# Patient Record
Sex: FEMALE | Race: WHITE | NOT HISPANIC OR LATINO | Employment: UNEMPLOYED
[De-identification: names, ages, dates, MRNs, and addresses within clinical notes are randomized per-mention and may not be internally consistent; named-entity substitution may affect disease eponyms.]

---
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
## 2021-05-06 ENCOUNTER — OFFICE VISIT - HEALTHEAST (OUTPATIENT)
Dept: PEDIATRICS | Facility: CLINIC | Age: 21
End: 2021-05-06

## 2021-05-06 ENCOUNTER — COMMUNICATION - HEALTHEAST (OUTPATIENT)
Dept: PEDIATRICS | Facility: CLINIC | Age: 21
End: 2021-05-06

## 2021-05-06 DIAGNOSIS — Z20.822 EXPOSURE TO COVID-19 VIRUS: ICD-10-CM

## 2021-05-07 DIAGNOSIS — Z11.52 ENCOUNTER FOR SCREENING LABORATORY TESTING FOR COVID-19 VIRUS: ICD-10-CM

## 2021-05-07 DIAGNOSIS — Z11.52 ENCOUNTER FOR SCREENING LABORATORY TESTING FOR COVID-19 VIRUS: Primary | ICD-10-CM

## 2021-05-07 LAB
SARS-COV-2 RNA RESP QL NAA+PROBE: NORMAL
SPECIMEN SOURCE: NORMAL

## 2021-05-07 PROCEDURE — U0005 INFEC AGEN DETEC AMPLI PROBE: HCPCS | Performed by: STUDENT IN AN ORGANIZED HEALTH CARE EDUCATION/TRAINING PROGRAM

## 2021-05-07 PROCEDURE — U0003 INFECTIOUS AGENT DETECTION BY NUCLEIC ACID (DNA OR RNA); SEVERE ACUTE RESPIRATORY SYNDROME CORONAVIRUS 2 (SARS-COV-2) (CORONAVIRUS DISEASE [COVID-19]), AMPLIFIED PROBE TECHNIQUE, MAKING USE OF HIGH THROUGHPUT TECHNOLOGIES AS DESCRIBED BY CMS-2020-01-R: HCPCS | Performed by: STUDENT IN AN ORGANIZED HEALTH CARE EDUCATION/TRAINING PROGRAM

## 2021-05-08 LAB
LABORATORY COMMENT REPORT: NORMAL
SARS-COV-2 RNA RESP QL NAA+PROBE: NEGATIVE
SPECIMEN SOURCE: NORMAL

## 2021-05-26 ENCOUNTER — RECORDS - HEALTHEAST (OUTPATIENT)
Dept: ADMINISTRATIVE | Facility: CLINIC | Age: 21
End: 2021-05-26

## 2021-05-30 VITALS — HEIGHT: 62 IN | BODY MASS INDEX: 33.31 KG/M2 | WEIGHT: 181 LBS

## 2021-06-02 VITALS — WEIGHT: 189.6 LBS | HEIGHT: 61 IN | BODY MASS INDEX: 35.8 KG/M2

## 2021-06-04 VITALS
SYSTOLIC BLOOD PRESSURE: 109 MMHG | HEART RATE: 76 BPM | WEIGHT: 194.9 LBS | DIASTOLIC BLOOD PRESSURE: 72 MMHG | HEIGHT: 63 IN | OXYGEN SATURATION: 97 % | BODY MASS INDEX: 34.54 KG/M2

## 2021-06-10 NOTE — PROGRESS NOTES
[facility] Well Child Check    ASSESSMENT & PLAN  Daisy Stephens is a 20 y.o. who has normal growth and abnormal development:  autism.    Diagnoses and all orders for this visit:    Class 1 obesity due to excess calories without serious comorbidity with body mass index (BMI) of 34.0 to 34.9 in adult  -     Ambulatory referral for Weight Management    Encounter for routine child health examination without abnormal findings  -     HPV vaccine 9 valent 3 dose IM    Other orders  -     Cancel: Urinalysis-UC if Indicated    The following nutrition counseling was performed this visit:  recommendation to change food and drink intake, weight-reducing diet education and referral to hospital-based dietetics service  The following physical activity counseling was performed this visit: recommendation to exercise and referral to weight management regimen service      Return to clinic in 1 year for a Well Child Check or sooner as needed    IMMUNIZATIONS/LABS  Immunizations were reviewed and orders were placed as appropriate.  I have discussed the risks and benefits of all of the vaccine components with the patient/parents.  All questions have been answered.    REFERRALS  Dental:  Recommend routine dental care as appropriate., The patient has already established care with a dentist.  Other:  Referrals were made for weight management.    ANTICIPATORY GUIDANCE  I have reviewed age appropriate anticipatory guidance.  Parenting:  Support and [place]/Dependence  Nutrition:  Age Specific Nutritional Needs  Play and Communication:  Appropriate Use of TV  Health:  Acne and Activity (>45 min/day)  Sexuality:  Contraception    HEALTH HISTORY  Do you have any concerns that you'd like to discuss today?: weight managment, IDU done when she was 16 is having some cramping and some blood. Would like it re done.  IUD not IDU    Ankle Injury: On 8/9/2020 the patient developed right ankle pain and swelling after falling down several stairs.  The next day she tripped and fell while walking to the bathroom. When she was falling she knocked down a cast iron pan which then fell onto her right toes. On 8/10 they went to [facility] and they diagnosed her with right forefoot contusion involving the first and second toes as well as right lateral ankle sprain and mild deltoid sprain. They put her in a boot and she has also been using a walker. Today her ankle is getting better. Patient is going to ortho tomorrow for another follow-up.    Weight: Patient was previously going to [facility]. Patient did not like them telling her what should could eat but when she followed their instructions she did well. Patient was also very active at that time with swimming, weight training, and equestrianism. However now she is very sedentary. They have a treadmill at home but she refuses to use it.    Review of Systems:  Patient has some acne. They have not tried applying anything to her face. Patient had an IUD inserted on 3/24/2016 at [facility]. Mom wants it replaced. She is unsure what kind of IUD she has. All other reviewed systems are negative.     PSFH:  No recent change to medical, surgical, family, or social history.    No question data found.    Do you have any significant health concerns in your family history?: No  Family History   Problem Relation Age of Onset     Autism Brother      Anxiety disorder Mother      Diabetes Mother      Autism Father      No Medical Problems Maternal Grandmother      Stroke Maternal Grandfather          at age 47     Hypertension Maternal Grandfather      Diabetes Maternal Grandfather      Lung cancer Paternal Grandfather         smoker     Since your last visit, have there been any major changes in your family, such as a move, job change, separation, divorce, or death in the family?: Yes mother  and they recently moved to [place]  Has a lack of transportation  kept you from medical appointments?: No    Home  Who lives in your home?:  Same.   Social History     Social History Narrative    Lives at home with mom, maternal grandmother, and younger brother (Ronaldo).     Do you have any concerns about losing your housing?: No  Is your housing safe and comfortable?: Yes  Do you have any trouble with sleep?:  Night owl.     Education  What school do you child attend?:  [employer].   What grade are you in?:  N/A  How do you perform in school (grades, behavior, attention, homework?: hard to try new things.    Last year school and distance learning went well. Next year she is doing hybrid learning though mom is tentative about going back to school.     Eating  Do you eat regular meals including fruits and vegetables?:  yes  What are you drinking (cow's milk, water, soda, juice, sports drinks, energy drinks, etc)?: cow's milk- 2%, water and Coffee.   Have you been worried that you don't have enough food?: No  Do you have concerns about your body or appearance?:  No   Patient is a picky eater.    Activities  Do you have friends?:  no  Do you get at least one hour of physical activity per day?:  no  How many hours a day are you in front of a screen other than for schoolwork (computer, TV, phone)?:  6-8  What do you do for exercise?:  Nothing.   Do you have interest/participate in community activities/volunteers/school sports?:  no    VISION/HEARING  Vision: Patient is already followed by a vision specialist  Hearing:  Not done.   Patient sees and hears well.     No exam data present    MENTAL HEALTH SCREENING  No flowsheet data found.  Social-emotional & mental health screening: Pediatric Symptom Checklist-Youth PASS (<30 pass), no followup necessary  No concerns    TB Risk Assessment:  The patient and/or parent/guardian answer positive to:  no known risk of TB    Dyslipidemia Risk Screening  Have either of your parents or any of your grandparents had a stroke or  "heart attack before age 55?: Yes: <Mothers father had stroke at the age of 47.   Any parents with high cholesterol or currently taking medications to treat?: No     Dental  When was the last time you saw the dentist?: 1-3 months ago   Parent/Guardian declines the fluoride varnish application today. Fluoride not applied today.    Patient Active Problem List   Diagnosis     Obesity     Autistic Disorder     Anxiety     Enuresis     Acne     Otitis Externa     Eczema     Overweight     Migraine Headache     Astigmatism       MEASUREMENTS  Height:  5' 3\" (1.6 m)  Weight: 194 lb 14.4 oz (88.4 kg)  BMI: Body mass index is 34.52 kg/m .  Blood Pressure: 109/72  Blood pressure percentiles are not available for patients who are 18 years or older.    PHYSICAL EXAM  Constitutional: She appears well-developed and well-nourished.   HEENT: Head: Normocephalic.    Right Ear: Tympanic membrane, external ear and canal normal.    Left Ear: Tympanic membrane, external ear and canal normal.    Nose: Nose normal.    Mouth/Throat: Mucous membranes are moist. Oropharynx is clear.    Eyes: Conjunctivae and lids are normal. Pupils are equal, round, and reactive to light. Optic discs are sharp.   Neck: Neck supple. No tenderness is present.   Cardiovascular: Normal rate and regular rhythm. No murmur heard.  Pulses: Femoral pulses are 2+ bilaterally.   Pulmonary/Chest: Effort normal and breath sounds normal. There is normal air entry. Breast development is normal.  Jaguar stage 5.   Abdominal: Soft. There is no hepatosplenomegaly. No inguinal hernia.   Musculoskeletal: Normal range of motion. Normal strength and tone. No abnormalities. Spine is straight. Normal duck walk.  Normal heel to toe walk.   Neurological: She is alert. She has normal reflexes. Gait normal.   Psychiatric: She has a normal mood and affect. Her speech is normal and behavior is normal.  Skin: Clear. No rashes.     ADDITIONAL HISTORY SUMMARIZED (2): None.  DECISION TO " OBTAIN EXTRA INFORMATION (1): None.   RADIOLOGY TESTS (1): None.  LABS (1): None.  MEDICINE TESTS (1): None.  INDEPENDENT REVIEW (2 each): None.       The visit lasted a total of 23 minutes face to face with the patient. Over 50% of the time was spent counseling and educating the patient about general health maintenance.    IFrancesca, am scribing for and in the presence of, Dr. Negro.    I, Dr. Negro, personally performed the services described in this documentation, as scribed by Francesca Huynh in my presence, and it is both accurate and complete.    Total data points: 0

## 2021-06-10 NOTE — PROGRESS NOTES
[facility] Well Child Check    ASSESSMENT & PLAN  Daisy Stephens is a 17  y.o. 2  m.o. who has normal growth and abnormal development:  autism and delay..    Diagnoses and all orders for this visit:    Encounter for routine child health examination without abnormal findings    Other orders  -     Poliovirus vaccine IPV subq/IM      Return to clinic in 1 year for a Well Child Check or sooner as needed    IMMUNIZATIONS/LABS  Immunizations were reviewed and orders were placed as appropriate.    REFERRALS  Dental:  Recommend routine dental care as appropriate., The patient has already established care with a dentist.  Other:  Patient will continue current established referrals with dermatology and eye doctor.    ANTICIPATORY GUIDANCE  I have reviewed age appropriate anticipatory guidance.    HEALTH HISTORY  Do you have any concerns that you'd like to discuss today?: No concerns      Acne: She is followed by a dermatologist for this concern. She plans to take one additional month of Accutane then be done. Per mom she has scarring now as opposed to open lesions.        Roomed by: kt    Accompanied by Mother    Refills needed? No    Do you have any forms that need to be filled out? Yes [employer] forms       Do you have any significant health concerns in your family history?: Yes: See below.   Family History   Problem Relation Age of Onset     Autism Brother      Anxiety disorder Mother      Diabetes Mother      Autism Father      No Medical Problems Maternal Grandmother      Stroke Maternal Grandfather       at age 47     Hypertension Maternal Grandfather      Diabetes Maternal Grandfather      Lung cancer Paternal Grandfather      smoker        Since your last visit, have there been any major changes in your family, such as a move, job change, separation, divorce, or death in the family?: No    Home  Who lives in your home?:    Social History     Social History Narrative    Lives at home with mom, maternal  grandmother, and younger brother (Ronaldo).     Do you have any trouble with sleep?:  Yes: She has difficulty sometimes after watching certain shows.    Education  What school does your child attend?:  [employer]  What grade is your child in?:  10th  How does the patient perform in school (grades, behavior, attention, homework?: Doing Well. She enjoys her teen class at the [employer]. She does not have behavior concerns at school. She is comforted by stacking and has rigid routines while at home. Her favorite subject is science and she enjoys plants.     Eating  Does patient eat regular meals including fruits and vegetables?:  Yes, she is sometimes selective  What is the patient drinking (cow's milk, water, soda, juice, sports drinks, energy drinks, etc)?: cow's milk- skim, cow's milk- 1%, water and sparkling water and very little soda  Does patient have concerns about body or appearance?:  No    Activities  Does the patient have friends?:  yes  Does the patient get at least one hour of physical activity per day?:  yes  Does the patient have less than 2 hours of screen time per day (aside from homework)?:  no, about 3-10 hours watches her movies and shows in the evening after homework  What does your child do for exercise?:  Gym class and works out every day  Does the patient have interest/participate in community activities/volunteers/school sports?:  Yes. She is in team fitness and swimming.    MENTAL HEALTH SCREENING  PHQ-2 Total Score: 0 (4/17/2017  3:00 PM)  PHQ-2 Total Score: 0 (4/17/2017  3:00 PM)    VISION/HEARING  Vision: Not done: Performed elsewhere: [facility] 11/2016, she has a follow-up appointment July 20, 2017.  Hearing:  Not done: mother has no concerns    Hearing Screening Comments: Mother has no concerns  Vision Screening Comments: Followed by vision specialist, [facility]    TB Risk Assessment:  The patient and/or parent/guardian answer positive to:  patient and/or  "parent/guardian answer 'no' to all screening TB questions    [employer] Application Screening  Is child seen by dentist?     Yes    Patient Active Problem List   Diagnosis     Obesity     Autistic Disorder     Anxiety     Enuresis     Acne     Otitis Externa     Eczema     Acute Sore Throat     Overweight     Migraine Headache       Drugs  Does the patient use tobacco/alcohol/drugs?:  N/A    Safety  Does the patient have any safety concerns (peer or home)?:  no  Does the patient use safety belts, helmets and other safety equipment?:  yes    Sex  Is the patient sexually active?:  N/A. She has an IUD and does not get periods.     MEASUREMENTS  Height:  5' 1.5\" (1.562 m)  Weight: 181 lb (82.1 kg)  BMI: Body mass index is 33.65 kg/(m^2).  Blood Pressure: 102/64  Blood pressure percentiles are 24 % systolic and 46 % diastolic based on NHBPEP's 4th Report. Blood pressure percentile targets: 90: 123/79, 95: 127/83, 99 + 5 mmH/96.    PHYSICAL EXAM  Constitutional: She appears well-developed and well-nourished.   HEENT: Head: Normocephalic.    Right Ear: Tympanic membrane, external ear and canal normal.    Left Ear: Tympanic membrane, external ear and canal normal.    Nose: Nose normal.    Mouth/Throat: Mucous membranes are moist. Oropharynx is clear.    Eyes: Conjunctivae and lids are normal. Pupils are equal, round, and reactive to light. Optic discs are sharp.   Neck: Neck supple. No tenderness is present.   Cardiovascular: Normal rate and regular rhythm. No murmur heard.  Pulses: Femoral pulses are 2+ bilaterally.   Pulmonary/Chest: Effort normal and breath sounds normal. There is normal air entry. Breast development is normal.  Jaguar stage 5.   Abdominal: Soft. There is no hepatosplenomegaly. No inguinal hernia.   Musculoskeletal: Normal range of motion. Normal strength and tone. No abnormalities. Spine is straight. Normal duck walk.  Normal heel to toe walk.   : Normal external female genitalia.  Jaguar " stage 5.   Neurological: She is alert. She has normal reflexes. Gait normal.   Psychiatric: She has a normal mood and affect. Her speech is normal and behavior is normal.  Skin: Clear. No rashes.     The visit lasted a total of 25 minutes face to face with the patient. Over 50% of the time was spent counseling and educating the patient about general wellness.    I, Melodie Cotton, am scribing for and in the presence of, Dr. Negro.    I, Dr. Negro, personally performed the services described in this documentation, as scribed by Melodie Cotton in my presence, and it is both accurate and complete.

## 2021-06-10 NOTE — TELEPHONE ENCOUNTER
Mom calling.    States her daughter was carrying laundry down steps last night, and missed the last step, and twisted her right ankle.    Mom states it swelled up, and she has bee  Icing it, and using ibuprofen, and elevating it.    She is asking where to go for help.    She was advised to go to [employer] [facility] in   [place] .    Mom agreed to POC.    Keli Hammer RN  [facility] Triage/refill nurse    Reason for Disposition    SEVERE pain (e.g., excruciating)    Additional Information    Negative: Major bleeding (actively dripping or spurting) that can't be stopped    Negative: Amputation or bone sticking through the skin    Negative: Looks like a dislocated joint (crooked or deformed)    Negative: Sounds like a life-threatening emergency to the triager    Negative: Wound looks infected    Negative: Caused by an animal bite    Negative: Puncture wound of foot    Negative: Toe injury is the main symptom    Negative: Cast problems or questions    Negative: Bullet, stabbed by knife or other serious penetrating wound    Negative: Can't stand (bear weight) or walk (e.g., 4 steps)    Negative: Skin is split open or gaping (length > 1/2 inch or 12 mm)    Negative: Bleeding won't stop after 10 minutes of direct pressure (using correct technique)    Negative: Dirt in the wound and not removed after 15 minutes of scrubbing    Negative: Numbness (new loss of sensation) of toe(s)    Negative: Looks infected (e.g., spreading redness, pus, red streak)    Sounds like a serious injury to the triager    Protocols used: ANKLE AND FOOT INJURY-A-[place]

## 2021-06-12 NOTE — TELEPHONE ENCOUNTER
Forms Request  Name of form/paperwork: Other:  [employer] Order form for incontinence supplies  Have you been seen for this request:   Yes  Do we have the form: Yes- See Scanned Document on 10/22/2020  When is form needed by:   As soon as possible  How would you like the form returned: Fax:  Attn: Astrid with [employer], [phone]  Patient Notified form requests are processed in 3-5 business days: Yes    Okay to leave a detailed message? Yes      Please have Provider sign and date form and fax back to [employer].  Thank you.

## 2021-06-17 NOTE — PROGRESS NOTES
"[facility] VIDEO Acute Visit Note:    The patient has been notified of following:     \"This video visit will be conducted via a call between you and your physician/provider. We have found that certain health care needs can be provided without the need for an in-person physical exam.  This service lets us provide the care you need with a video conversation.  If a prescription is necessary we can send it directly to your pharmacy.  If lab work is needed we can place an order for that and you can then stop by our lab to have the test done at a later time.    Video visits are billed at different rates depending on your insurance coverage. Please reach out to your insurance provider with any questions.    If during the course of the call the physician/provider feels a video visit is not appropriate, you will not be charged for this service.\"    Patient has given verbal consent to a Video visit? Yes    Patient would like to receive their AVS by AVS Preference: Mail a copy.    Patient would like the video invitation sent by: Text to cell phone: [phone]    Will anyone else be joining your video visit from another phone/email address? No        Video Start Time: 4:15 pm      Video-Visit Details    Type of service:  Video Visit    Video End Time (time video stopped): 4:45 pm (30 minutes)  Originating Location (pt. Location): Home    Distant Location (provider location):  [facility]     Mode of Communication:  [facility] Conference [facility]      CHIEF COMPLAINT:  Chief Complaint   Patient presents with     Other     brother was exposed 4.27 and 4.29-(5.4 found positive for COVID)       HISTORY OF PRESENT ILLNESS:  Daisy Stephens is a 21 y.o. female who is being evaluated via a billable video visit due to the ongoing COVID-19 pandemic.     Start: 4:15 pm  End: 4:45 pm      Mom states that Daisy's brother Chuck was exposed to COVID-19, tested on 5/4/2021 and received results " today that he is positive.  Daisy has been in contact with him, so we will need to be tested.    Chuck was exposed by his paraprofessional on 4/27/2021, but did not know it at the time.  He remained in school and another boy in the classroom was also in contact with him on 4/29/2021 who they later were told was Covid positive as well.    No members of the household have been vaccinated yet.  The household contains Chuck, Daisy, and her parents.  The parents are being tested tomorrow, which is day 6 of exposure.    Mom states Daisy has been healthy, without fever, rhinorrhea, nasal congestion, or cough.  No change in appetite, no nausea, no vomiting or diarrhea, no rash.  She is autistic, and her behavior has been at baseline without any changes.      REVIEW OF SYSTEMS:   All other systems are negative.    PFSH:  Social History     Social History Narrative    Lives at home with mom, maternal grandmother, and younger brother (Ronaldo).     Has been attending school in person    MEDICATIONS:  No current outpatient medications on file.     No current facility-administered medications for this visit.        PHYSICAL EXAM:  GENERAL: Healthy, alert and no distress  EYES: Eyes grossly normal to inspection. No discharge or erythema, or obvious scleral/conjunctival abnormalities.  HENT: Normal cephalic/atraumatic.  External ears, nose and mouth without ulcers or lesions. No nasal drainage visible.  RESP: No audible wheeze, cough, or visible cyanosis.  No visible retractions or increased work of breathing.    NEURO: Cranial nerves grossly intact.   PSYCH: Nonverbal, consistent with autistic diagnosis      ASSESSMENT and PLAN:  1. Exposure to COVID-19 virus  CAsymptomatic COVID-19 Virus (CORONAVIRUS) PCR       Counseled mom that due to exposure to person positive for COVID-19, she does meet criteria for testing for COVID-19.  Reviewed [facility] guidelines for testing and quarantine, including that she must remain quarantined for 14  "days given that she has been been in direct exposure to her brother.  Reiterated that a negative test will not allow her to come out of exposure, and if anyone else in the family tests positive or develop symptoms, this exposure time \"resets\".  Advised that we will contact her with results when they are available, and close monitoring-if she develops symptoms, advised on symptomatic cares and contacting clinic if symptoms are worsening or failing to improve.  Also recommended that family complete vaccine series following quarantine/convalescence. Mom acknowledged understanding and agrees with plan.      Return for If symptoms are worsening/not improving.      Total time spent on date of encounter was 31 minutes, including pre-charting time and face to face with the patient. The time was spent counseling and educating the patient/parent about exposure to COVID-19, testing for COVID-19, quarantine/isolation protocol, vaccine for COVID-19, symptomatic cares, follow-up.    Amber Foster MD    "

## 2021-06-17 NOTE — TELEPHONE ENCOUNTER
Reason for Call:  Other orders for a covid-19 test her brothers test came back positive today and she needs orders for the test     Detailed comments:      Phone Number Patient can be reached at:   Cell number on file:  [phone] Josefa Briggs  No relevant phone numbers on file.       Best Time: any    Can we leave a detailed message on this number?: Yes    Call taken on 5/6/2021 at 2:18 PM by Mary Ann Avalos

## 2021-06-18 NOTE — PATIENT INSTRUCTIONS - HE
Patient Instructions by Francesca Huynh Scribe at 8/19/2020 12:00 PM     Author: Francesca Huynh Scribe Service: -- Author Type: Robbiejose    Filed: 8/19/2020 12:34 PM Encounter Date: 8/19/2020 Status: Addendum    : Francesca Huynh Scribe ([place])    Related Notes: Original Note by Michael Negro MD (Physician) filed at 8/19/2020 12:25 PM       [facility]:  [phone]       Patient Education      [employer] HANDOUT- PATIENT  18 THROUGH 21 YEAR VISITS  Here are some suggestions from [employer] experts that may be of value to your family.     HOW YOU ARE DOING  Enjoy spending time with your family.  Find activities you are really interested in, such as sports, theater, or volunteering.  Try to be responsible for your schoolwork or work obligations.  Always talk through problems and never use violence.  If you get angry with someone, try to walk away.  If you feel unsafe in your home or have been hurt by someone, let us know. Hotlines and community agencies can also provide confidential help.  Talk with us if you are worried about your living or food situation. Community agencies and programs such as SNAP can help.  Dont smoke, vape, or use drugs. Avoid people who do when you can. Talk with us if you are worried about alcohol or drug use in your family.    YOUR DAILY LIFE  Visit the dentist at least twice a year.  Brush your teeth at least twice a day and floss once a day.  Be a healthy eater.  Have vegetables, fruits, lean protein, and whole grains at meals and snacks.  Limit fatty, sugary, salty foods that are low in nutrients, such as candy, chips, and ice cream.  Eat when youre hungry. Stop when you feel satisfied.  Eat breakfast.  Drink plenty of water.  Make sure to get enough calcium every day.  Have 3 or more servings of low-fat (1%) or fat-free milk and other low-fat dairy products, such as yogurt and cheese.  Women: Make sure to eat foods rich in folate, such as fortified grains  and dark- green leafy vegetables.  Aim for at least 1 hour of physical activity every day.  Wear safety equipment when you play sports.  Get enough sleep.  Talk with us about managing your health care and insurance as an adult.    YOUR FEELINGS  Most people have ups and downs. If you are feeling sad, depressed, nervous, irritable, hopeless, or angry, let us know or reach out to another health care professional.  Figure out healthy ways to deal with stress.  Try your best to solve problems and make decisions on your own.  Sexuality is an important part of your life. If you have any questions or concerns, we are here for you.    HEALTHY BEHAVIOR CHOICES  Avoid using drugs, alcohol, tobacco, steroids, and diet pills. Support friends who choose not to use.  If you use drugs or alcohol, let us know or talk with another trusted adult about it. We can help you with quitting or cutting down on your use.  Make healthy decisions about your sexual behavior.  If you are sexually active, always practice safe sex. Always use birth control along with a condom to prevent pregnancy and sexually transmitted infections.  All sexual activity should be something you want. No one should ever force or try to convince you.  Protect your hearing at work, home, and concerts. Keep your earbud volume down.    STAYING SAFE  Always be a safe and cautious .  Insist that everyone use a lap and shoulder seat belt.  Limit the number of friends in the car and avoid driving at night.  Avoid distractions. Never text or talk on the phone while you drive.  Do not ride in a vehicle with someone who has been using drugs or alcohol.  If you feel unsafe driving or riding with someone, call someone you trust to drive you.  Wear helmets and protective gear while playing sports. Wear a helmet when riding a bike, a motorcycle, or an ATV or when skiing or skateboarding.  Always use sunscreen and a hat when youre outside.  Fighting and carrying weapons can  be dangerous. Talk with your parents, teachers, or doctor about how to avoid these situations.      Helpful Resources:  National Domestic Violence Hotline: 278.848.3600   Consistent with Bright Futures: Guidelines for Health Supervision of Infants, Children, and Adolescents, 4th Edition  For more information, go to https://brightfutures.aap.org.

## 2021-06-19 ENCOUNTER — HEALTH MAINTENANCE LETTER (OUTPATIENT)
Age: 21
End: 2021-06-19

## 2021-06-22 NOTE — PROGRESS NOTES
[facility] Well Child Check    ASSESSMENT & PLAN  Daisy Stephens is a 18  y.o. 10  m.o. who has normal growth and abnormal development:  Patient with autism.    Diagnoses and all orders for this visit:    Encounter for routine child health examination without abnormal findings  -     Meningococcal MCV4P  -     HPV vaccine 9 valent 3 dose IM  -     Influenza, Seasonal Quad, Preservative Free 36+ Months (syringe)  -     Hearing Screening        Return to clinic in 1 year for a Well Child Check or sooner as needed    IMMUNIZATIONS/LABS  Immunizations were reviewed and orders were placed as appropriate. and I have discussed the risks and benefits of all of the vaccine components with the patient/parents.  All questions have been answered.    REFERRALS  Dental:  Recommend routine dental care as appropriate., The patient has already established care with a dentist.  Other:  No additional referrals were made at this time.    ANTICIPATORY GUIDANCE  I have reviewed age appropriate anticipatory guidance.  Social:  Friends and Changes and Choices  Parenting:  Support  Nutrition:  Dieting and Body Image  Health:  Activity (>45 min/day), Sleep, Sun Screen and Dental Care  Safety:  Seat Belts and Outdoor Safety Avoiding Sun Exposure    HEALTH HISTORY  Do you have any concerns that you'd like to discuss today?: IUD      IUD: She had an IUD placed in 03/2016. She has been complaining of abdominal cramping recently. She does not get periods anymore.     PFSH:  Mom is applying for guardianship for Daisy.    Roomed by: Fanta    Accompanied by Mother    Refills needed? No    Do you have any forms that need to be filled out? No        Do you have any significant health concerns in your family history?: Yes: Mom has skin cancer  Family History   Problem Relation Age of Onset     Autism Brother      Anxiety disorder Mother      Diabetes Mother      Autism Father      No Medical Problems Maternal Grandmother      Stroke Maternal  Grandfather          at age 47     Hypertension Maternal Grandfather      Diabetes Maternal Grandfather      Lung cancer Paternal Grandfather         smoker     Since your last visit, have there been any major changes in your family, such as a move, job change, separation, divorce, or death in the family?: Yes - dad is marrying step mom in Feb  Has a lack of transportation kept you from medical appointments?: No  She states that she is feeling sad about her dad getting . She says she has cried about this. She usually sees him for a few weekends. He had been caring for the kids every other weekend. Mom states that this stopped happening since the start of November. She had been avoiding meeting with dad and step mom. She met step mom last weekend. She did spend some time with dad on Tuesday.     Home  Who lives in your home?:  Same  Social History     Social History Narrative    Lives at home with mom, maternal grandmother, and younger brother (Ronaldo).     Do you have any concerns about losing your housing?: No  Is your housing safe and comfortable?: Yes  Do you have any trouble with sleep?:  No  She is a good sleeper.    Education  What school do you child attend?:  [employer]  What grade are you in?:  12th  How do you perform in school (grades, behavior, attention, homework?: Mom says pretty good   School is going well for her. She will stay in school until she is 22. She is in the step program. She enjoys her art class.     Eating  Do you eat regular meals including fruits and vegetables?:  yes  What are you drinking (cow's milk, water, soda, juice, sports drinks, energy drinks, etc)?: cow's milk- 1%, cow's milk- 2% and water  Have you been worried that you don't have enough food?: No  Do you have concerns about your body or appearance?:  Yes: Weight  Mom notes that they will start changing her eating habits after the holidays. She states she is having issues with frequent urination and  "hard stools. She has a banana everyday for lunch and snack. She is good at eating meat. She enjoys drinking milk. She has milk with breakfast, lunch, and dinner.     Activities  Do you have friends?:  yes  Do you get at least one hour of physical activity per day?:  yes  How many hours a day are you in front of a screen other than for schoolwork (computer, TV, phone)?:  6  What do you do for exercise?:  Team fitness, swimming, horse back riding, weight training  Do you have interest/participate in community activities/volunteers/school sports?:  yes    MENTAL HEALTH SCREENING  No Data Recorded  No Data Recorded    VISION/HEARING  Vision: Patient is already followed by a vision specialist, followed annually, she wears glasses, she has astigmatism.  Hearing:  Completed. See Results    No exam data present    TB Risk Assessment:  The patient and/or parent/guardian answer positive to:  patient and/or parent/guardian answer 'no' to all screening TB questions    Dyslipidemia Risk Screening  Have either of your parents or any of your grandparents had a stroke or heart attack before age 55?: Yes: maternal grandfather  of a stroke at 46  Any parents with high cholesterol or currently taking medications to treat?: No     Dental  When was the last time you saw the dentist?: 3-6 months ago   Parent/Guardian declines the fluoride varnish application today. Fluoride not applied today.    Patient Active Problem List   Diagnosis     Obesity     Autistic Disorder     Anxiety     Enuresis     Acne     Otitis Externa     Eczema     Overweight     Migraine Headache     Astigmatism       Drugs  Does the patient use tobacco/alcohol/drugs?:  no    Safety  Does the patient have any safety concerns (peer or home)?:  no  Does the patient use safety belts, helmets and other safety equipment?:  yes    Sex  Have you ever had sex?:  No, per mom    MEASUREMENTS  Height:  5' 1.42\" (1.56 m)  Weight: 189 lb 9.6 oz (86 kg)  BMI: Body mass index " is 35.34 kg/m .  Blood Pressure: 108/74  Blood pressure percentiles are 45 % systolic and 84 % diastolic based on the 2017 AAP Clinical Practice Guideline. Blood pressure percentile targets: 90: 123/76, 95: 127/80, 95 + 12 mmH/92.    PHYSICAL EXAM  Constitutional: She appears well-developed and well-nourished.   HEENT: Head: Normocephalic.    Right Ear: Tympanic membrane, external ear and canal normal.    Left Ear: Tympanic membrane, external ear and canal normal.    Nose: Nose normal.    Mouth/Throat: Mucous membranes are moist. Oropharynx is clear.    Eyes: Conjunctivae and lids are normal. Pupils are equal, round, and reactive to light. Optic discs are sharp.   Neck: Neck supple. No tenderness is present.   Cardiovascular: Normal rate and regular rhythm. No murmur heard.  Pulses: Femoral pulses are 2+ bilaterally.   Pulmonary/Chest: Effort normal and breath sounds normal. There is normal air entry. Breast development is normal.  Jaguar stage 5.   Abdominal: Soft. There is no hepatosplenomegaly. No inguinal hernia.   Musculoskeletal: Normal range of motion. Normal strength and tone. No abnormalities. Spine is straight. Normal duck walk.  Normal heel to toe walk.   : Normal external female genitalia.  Jaguar stage 5.   Neurological: She is alert. She has normal reflexes. Gait normal.   Psychiatric: She has a normal mood and affect. Her speech is normal and behavior is normal.  Skin: Clear. No rashes.       ADDITIONAL HISTORY SUMMARIZED (2): None.  DECISION TO OBTAIN EXTRA INFORMATION (1): None.   RADIOLOGY TESTS (1): None.  LABS (1): None.  MEDICINE TESTS (1): None.  INDEPENDENT REVIEW (2 each): None.     The visit lasted a total of 33 minutes face to face with the patient. Over 50% of the time was spent counseling and educating the patient about general wellness.    Alanis BLACKWELL, am scribing for and in the presence of, Dr. Negro.    IDr. Negro, personally performed the services described in this  documentation, as scribed by Alanis Wade in my presence, and it is both accurate and complete.    Total data points: 0

## 2021-10-04 ENCOUNTER — HEALTH MAINTENANCE LETTER (OUTPATIENT)
Age: 21
End: 2021-10-04

## 2022-07-18 ENCOUNTER — TRANSFERRED RECORDS (OUTPATIENT)
Dept: HEALTH INFORMATION MANAGEMENT | Facility: CLINIC | Age: 22
End: 2022-07-18

## 2022-07-21 NOTE — MR AVS SNAPSHOT
After Visit Summary   11/13/2017    Daisy Stephens    MRN: [id number]           Patient Information     Date Of Birth          2000        Visit Information        Provider Department      11/13/2017 12:00 PM Melodie Augustine RD [facility] [place]        Today's Diagnoses     Class 2 obesity    -  1    Low HDL (under 40)        Gross motor delay           Follow-ups after your visit        Your next 10 appointments already scheduled     May 14, 2018 12:00 PM CDT   Return Visit with Lyndsay Nichole MD   [facility] [place] ([facility] [place])    [id number] [place] [address] [phone]            May 14, 2018 12:30 PM CDT   Return Visit with Melodie Augustine RD   [facility] [place] ([facility] [place])    [address] [place] [address] [phone]              Who to contact     If you have questions or need follow up information about today's clinic visit or your schedule please contact [facility] directly at [phone].  Normal or non-critical lab and imaging results will be communicated to you by Cell Genesyshart, letter or phone within 4 business days after the clinic has received the results. If you do not hear from us within 7 days, please contact the clinic through [employer] or phone. If you have a critical or abnormal lab result, we will notify you by phone as soon as possible.  Submit refill requests through Syndero or call your pharmacy and they will forward the refill request to us. Please allow 3 business days for your refill to be completed.          Additional Information About Your Visit        Cell [employer] Information     Syndero is an electronic gateway that provides easy, online access to your medical records. With Syndero, you can request a clinic appointment, read your test results, renew a prescription or communicate with your care team.     To sign up for Syndero, please contact your  From: Shani Adame  To: Julio Donaldson  Sent: 7/21/2022 11:00 AM CDT  Subject: Back    I think I'm going to go through with the fusion since Dr paige had nuthn for me..only thing is I'm going to wait til my knee heels more and see what Dr bajwa says about it in my next update in a few weeks..so thank you and I'll give u an update then.. again thank you   [facility] or call [phone] for assistance.           Care EveryWhere ID     This is your Care EveryWhere ID. This could be used by other organizations to access your [facility] medical records  Opted out of Care Everywhere exchange         Blood Pressure from Last 3 Encounters:   04/24/17 110/67   09/26/16 109/55   06/27/16 93/62    Weight from Last 3 Encounters:   11/13/17 80.6 kg (177 lb 11.1 oz) (96 %)*   08/15/17 78.8 kg (173 lb 11.2 oz) (95 %)*   04/24/17 81.2 kg (179 lb 0.2 oz) (96 %)*     * Growth percentiles are based on [facility] 2-20 Years data.              We Performed the Following     MNT INDIVIDUAL F/U REASSESS, EA 15 MIN        Primary Care Provider Office Phone # Fax #    Michael Negro MD, [phone]       [address] [place] [address]        Equal Access to Services     ASHISH SIERRA : Hadii aad ku hadasho Soomaali, waaxda luqadaha, qaybta kaalmada adeegyada, waxay idiin hayaan sanjuanita cantu . So [facility] [phone].    ATENCIÓN: Si habla español, tiene a pollard disposición servicios gratuitos de asistencia lingüística. [facility] [phone].    We comply with applicable federal civil rights laws and [place] laws. We do not discriminate on the basis of race, color, national origin, age, disability, sex, sexual orientation, or gender identity.            Thank you!     Thank you for choosing [facility]  for your care. Our goal is always to provide you with excellent care. Hearing back from our patients is one way we can continue to improve our services. Please take a few minutes to complete the written survey that you may receive in the mail after your visit with us. Thank you!             Your Updated Medication List - Protect others around you: Learn how to safely use, store and throw away your medicines at www.disposemymeds.org.          This list is accurate as of: 11/13/17 11:59 PM.  Always use your  most recent med list.                   Brand Name Dispense Instructions for use Diagnosis    acetaminophen 325 MG tablet    TYLENOL     Reported on 4/24/2017        ibuprofen 800 MG tablet    ADVIL/MOTRIN     Reported on 4/24/2017        MULTI-VITAMIN GUMMIES PO      Take 1 tablet by mouth daily        OMEGA-3 FISH OIL PO      Take 2 tablets by mouth daily        vitamin D 2000 UNITS tablet      Take 1 tablet by mouth daily

## 2022-08-16 ENCOUNTER — DOCUMENTATION ONLY (OUTPATIENT)
Dept: OTHER | Facility: CLINIC | Age: 22
End: 2022-08-16

## 2022-08-16 ENCOUNTER — OFFICE VISIT (OUTPATIENT)
Dept: PEDIATRICS | Facility: CLINIC | Age: 22
End: 2022-08-16
Payer: COMMERCIAL

## 2022-08-16 VITALS
DIASTOLIC BLOOD PRESSURE: 72 MMHG | HEIGHT: 62 IN | BODY MASS INDEX: 38.83 KG/M2 | SYSTOLIC BLOOD PRESSURE: 100 MMHG | WEIGHT: 211 LBS | TEMPERATURE: 98.2 F | HEART RATE: 73 BPM | OXYGEN SATURATION: 96 %

## 2022-08-16 DIAGNOSIS — Z11.4 SCREENING FOR HIV (HUMAN IMMUNODEFICIENCY VIRUS): ICD-10-CM

## 2022-08-16 DIAGNOSIS — Z00.129 ENCOUNTER FOR ROUTINE CHILD HEALTH EXAMINATION W/O ABNORMAL FINDINGS: Primary | ICD-10-CM

## 2022-08-16 DIAGNOSIS — Z11.59 NEED FOR HEPATITIS C SCREENING TEST: ICD-10-CM

## 2022-08-16 DIAGNOSIS — Z12.4 CERVICAL CANCER SCREENING: ICD-10-CM

## 2022-08-16 PROCEDURE — 99395 PREV VISIT EST AGE 18-39: CPT | Mod: 25 | Performed by: PEDIATRICS

## 2022-08-16 PROCEDURE — 92551 PURE TONE HEARING TEST AIR: CPT | Performed by: PEDIATRICS

## 2022-08-16 PROCEDURE — 96127 BRIEF EMOTIONAL/BEHAV ASSMT: CPT | Performed by: PEDIATRICS

## 2022-08-16 PROCEDURE — 99173 VISUAL ACUITY SCREEN: CPT | Mod: 59 | Performed by: PEDIATRICS

## 2022-08-16 PROCEDURE — 90471 IMMUNIZATION ADMIN: CPT | Mod: SL | Performed by: PEDIATRICS

## 2022-08-16 PROCEDURE — 99214 OFFICE O/P EST MOD 30 MIN: CPT | Mod: 25 | Performed by: PEDIATRICS

## 2022-08-16 PROCEDURE — 90715 TDAP VACCINE 7 YRS/> IM: CPT | Mod: SL | Performed by: PEDIATRICS

## 2022-08-16 SDOH — ECONOMIC STABILITY: INCOME INSECURITY: IN THE LAST 12 MONTHS, WAS THERE A TIME WHEN YOU WERE NOT ABLE TO PAY THE MORTGAGE OR RENT ON TIME?: NO

## 2022-08-16 NOTE — PROGRESS NOTES
Daisy Stephens is 22 year old, here for a preventive care visit.    Assessment & Plan     Daisy was seen today for well child.    Diagnoses and all orders for this visit:    Screening for HIV (human immunodeficiency virus)    Need for hepatitis C screening test    Cervical cancer screening    Other orders  -     REVIEW OF HEALTH MAINTENANCE PROTOCOL ORDERS  -     TDAP VACCINE (Adacel, Boostrix)        Growth        Height: Normal , Weight: Obesity (BMI 95-99%)    high BMI    Immunizations     Appropriate vaccinations were ordered.  I provided face to face vaccine counseling, answered questions, and explained the benefits and risks of the vaccine components ordered today including:  Tdap 7 yrs+  Patient/Parent(s) declined some/all vaccines today.  covid  MenB Vaccine not discussed.    Anticipatory Guidance    Reviewed age appropriate anticipatory guidance.     Referrals/Ongoing Specialty Care  No    Follow Up      No follow-ups on file.    Subjective     Additional Questions 8/16/2022   Do you have any questions today that you would like to discuss? No     Patient has been advised of split billing requirements and indicates understanding: Yes    Social 8/16/2022   Who do you live with? Family   Have you experienced any stressful events recently? None   In the past 12 months, has lack of transportation kept you from medical appointments or from getting medications? No   In the last 12 months, was there a time when you were not able to pay the mortgage or rent on time? No   In the last 12 months, was there a time when you did not have a steady place to sleep or slept in a shelter (including now)? No       Health Risks/Safety 8/16/2022   Do you always wear a seat belt? Yes   Do you wear a helmet for bicyle, rollerblades, skatebard, scooter, skiing/snowboarding, ATV/snowmobile, motorcycle?  Yes       TB Screening 8/16/2022   Since your last Well Child visit, have any of your family members or close contacts had  tuberculosis or a positive tuberculosis test?  No   Since your last check-up, have you or any of your family members or close contacts traveled or lived outside of the [place]? No   Since your last check-up, have you lived in a high-risk group setting like a correctional facility, health care facility, homeless shelter, or refugee camp? No            Diet 8/16/2022   Do you have questions about your eating?  No   Do you have questions about your weight?  No   What do you regularly drink? Water, Cow's Milk, (!) POP, (!) COFFEE OR TEA   What type of water? Tap, (!) WELL, (!) BOTTLED, (!) FILTERED   Do you think you eat healthy foods? Yes   Do you get at least 3 servings of food or beverages that have calcium each day (dairy, green leafy vegetables, etc.)? Yes   How would you describe your diet?  No restrictions, (!) HIGH PROTEIN   Within the past 12 months, you worried that your food would run out before you got money to buy more. Never true   Within the past 12 months, the food you bought just didn't last and you didn't have money to get more. Never true   She eats fruits at least 1 time a day and not as many vegetables.     Activity-- They are working on going to the gym/working out more. They are also involved on an equestrian team.     Media-- Patient likes to watch movies or old shows.     Sleep 8/16/2022   Do you have any trouble with sleep? No       Vision/Hearing 8/16/2022   Do you have any concerns about your hearing or vision?  No concerns       Teen Screen  Teen Screen not completed: over 22       AMB [facility] MENSES SECTION 8/16/2022   What are your periods like?  (!) OTHER   Please specify: Have IUD   IUD removal and re-insertion pre-op today as well.     Review of Systems:  Constitutional, eye, ENT, skin, respiratory, cardiac, and GI are normal except as otherwise noted.    PSFH:  No recent change to medical, surgical, family, or social history.       Objective     Exam    Physical Exam    Constitutional: She appears well-developed and well-nourished.   HEENT: Head: Normocephalic.    Right Ear: Tympanic membrane, external ear and canal normal.    Left Ear: Tympanic membrane, external ear and canal normal.    Nose: Nose normal.    Mouth/Throat: Mucous membranes are moist. Oropharynx is clear.    Eyes: Conjunctivae and lids are normal. Pupils are equal, round, and reactive to light. Optic discs are sharp.   Neck: Neck supple. No tenderness is present.   Cardiovascular: Normal rate and regular rhythm. No murmur heard.  Pulses: Femoral pulses are 2+ bilaterally.   Pulmonary/Chest: Effort normal and breath sounds normal. There is normal air entry. Breast development is normal.  Jaguar stage 5.   Abdominal: Soft. There is no hepatosplenomegaly. No inguinal hernia.   Musculoskeletal: Normal range of motion. Normal strength and tone. No abnormalities. Spine is straight. Normal duck walk.  Normal heel to toe walk.   : Normal external female genitalia.  Jaguar stage 5.   Neurological: She is alert. She has normal reflexes. Gait normal.   Psychiatric: She has a normal mood and affect. Her speech is normal and behavior is normal.  Skin: Clear. No rashes.     ADDITIONAL HISTORY SUMMARIZED (2): None.  DECISION TO OBTAIN EXTRA INFORMATION (1): None.   RADIOLOGY TESTS (1): None.  LABS (1): None.  MEDICINE TESTS (1): None.  INDEPENDENT REVIEW (2 each): None.     The visit lasted a total of 25 minutes spent on the date of the encounter doing chart review, history and exam, documentation, and further activities as noted above.     I, Lanie Cotto, am scribing for and in the presence of, Dr. Negro.    I, Dr. Negro, personally performed the services described in this documentation, as scribed by Lanie Cotto in my presence, and it is both accurate and complete.    Total data points: 0      Michael Negro MD  [facility]

## 2022-08-16 NOTE — PROGRESS NOTES
[facility]  [address] [address] [phone]  Dept: [phone]    PRE-OP EVALUATION:  Daisy Stephens is a 22 year old female, here for a pre-operative evaluation, accompanied by her mother, brother and maternal grandmother    Today's date: 8/16/2022  This report to be faxed to [facility] ([phone])  Primary Physician: Michael Negro MD   Type of Anesthesia Anticipated: General    PRE-OP PEDIATRIC QUESTIONS 8/16/2022   What procedure is being done? Removal and re entry of IUD   Date of surgery / procedure: Sept 8,2022   Facility or Hospital where procedure/surgery will be performed: [facility]   Who is doing the procedure / surgery? Dr Berna Damon   1.  In the last week, has your child had any illness, including a cold, cough, shortness of breath or wheezing? No   2.  In the last week, has your child used ibuprofen or aspirin? No   3.  Does your child use herbal medications?  No   5.  Has your child ever had wheezing or asthma? No   6. Does your child use supplemental oxygen or a C-PAP Machine? No   7.  Has your child ever had anesthesia or been put under for a procedure? No   8.  Has your child or anyone in your family ever had problems with anesthesia? No   9.  Does your child or anyone in your family have a serious bleeding problem or easy bruising? No   10. Has your child ever had a blood transfusion?  No   11. Does your child have an implanted device (for example: cochlear implant, pacemaker,  shunt)? No       HPI:     Brief HPI related to upcoming procedure:    Patient will be getting her IUD removed and reinserted.    Patient has previously gone under anesthesia to have the IUD put in. It went well. Patient does not have history of unusual bleeding or bruising. No family history of unusual bleeding, bruising, or problems with anesthesia. No known exposure to any illnesses, and patient has otherwise been  "healthy.     Medical History:     PROBLEM LIST  Patient Active Problem List    Diagnosis Date Noted     Class 2 obesity 05/31/2016     Priority: Medium     Vitamin D deficiency 05/31/2016     Priority: Medium     Low HDL (under 40) 05/31/2016     Priority: Medium     Gross motor delay 05/31/2016     Priority: Medium     Lack of coordination 05/31/2016     Priority: Medium       SURGICAL HISTORY  No past surgical history on file.    MEDICATIONS  acetaminophen (TYLENOL) 325 MG tablet, Reported on 4/24/2017 (Patient not taking: Reported on 8/16/2022)  Cholecalciferol (VITAMIN D) 2000 UNITS tablet, Take 1 tablet by mouth daily  ibuprofen (ADVIL,MOTRIN) 800 MG tablet, Reported on 4/24/2017 (Patient not taking: Reported on 8/16/2022)  Multiple Vitamins-Minerals (MULTI-VITAMIN GUMMIES PO), Take 1 tablet by mouth daily  Omega-3 Fatty Acids (OMEGA-3 FISH OIL PO), Take 2 tablets by mouth daily    No current facility-administered medications on file prior to visit.      ALLERGIES  Allergies   Allergen Reactions     Other Environmental Allergy Other (See Comments)     Seasonal Allergies         Review of Systems:   Review of Systems:  Constitutional, eye, ENT, skin, respiratory, cardiac, and GI are normal except as otherwise noted.    PSFH:  No recent change to medical, surgical, family, or social history.      Physical Exam:     /72   Pulse 73   Temp 98.2  F (36.8  C) (Oral)   Ht 5' 1.61\" (1.565 m)   Wt 211 lb (95.7 kg)   SpO2 96%   BMI 39.08 kg/m    Facility age limit for growth percentiles is 20 years.  Facility age limit for growth percentiles is 20 years.  Facility age limit for growth percentiles is 20 years.  Growth percentile SmartLinks can only be used for patients less than 20 years old.  Alert, no acute distress.   HEENT, conjunctivae are clear, TMs are without erythema, pus or fluid. Position and landmarks are normal.  Nose is clear.  Oropharynx is moist and clear, without tonsillar hypertrophy, " asymmetry, exudate or lesions.  Neck is supple without adenopathy or thyromegaly.  Lungs have good air entry bilaterally, no wheezes or crackles.  No prolongation of expiratory phase.   No tachypnea, retractions, or increased work of breathing.  Cardiac exam regular rate and rhythm, normal S1 and S2.  Abdomen is soft and nontender, bowel sounds are present, no hepatosplenomegaly or mass palpable.  Skin, clear without rash      Diagnostics:   None indicated     Assessment/Plan:   Daisy Stephens is a 22 year old female, presenting for:  Problem List Items Addressed This Visit    None     Visit Diagnoses     Screening for HIV (human immunodeficiency virus)        Need for hepatitis C screening test        Cervical cancer screening              Airway/Pulmonary Risk: None identified  Cardiac Risk: None identified  Hematology/Coagulation Risk: None identified  Metabolic Risk: Obesity   Pain/Comfort Risk: None identified     Approval given to proceed with proposed procedure, without further diagnostic evaluation    Copy of this evaluation report is provided to requesting physician.    ____________________________________  August 16, 2022      Signed Electronically by: Michael Negro MD    [address] [address]  Phone: [phone]  Fax: [phone]

## 2022-08-16 NOTE — PATIENT INSTRUCTIONS
[facility] provider for 21+ patients: Hari England    Recommended a multivitamin with vitamin D and iron-- [place]'s Complete is chewable but Women's one-a-day is a pill  Patient Education    BRIGHT [employer] HANDOUT- PATIENT  18 THROUGH 21 YEAR VISITS  Here are some suggestions from [employer] experts that may be of value to your family.     HOW YOU ARE DOING  Enjoy spending time with your family.  Find activities you are really interested in, such as sports, theater, or volunteering.  Try to be responsible for your schoolwork or work obligations.  Always talk through problems and never use violence.  If you get angry with someone, try to walk away.  If you feel unsafe in your home or have been hurt by someone, let us know. Hotlines and community agencies can also provide confidential help.  Talk with us if you are worried about your living or food situation. Community agencies and programs such as SNAP can help.  Don t smoke, vape, or use drugs. Avoid people who do when you can. Talk with us if you are worried about alcohol or drug use in your family.    YOUR DAILY LIFE  Visit the dentist at least twice a year.  Brush your teeth at least twice a day and floss once a day.  Be a healthy eater.  Have vegetables, fruits, lean protein, and whole grains at meals and snacks.  Limit fatty, sugary, salty foods that are low in nutrients, such as candy, chips, and ice cream.  Eat when you re hungry. Stop when you feel satisfied.  Eat breakfast.  Drink plenty of water.  Make sure to get enough calcium every day.  Have 3 or more servings of low-fat (1%) or fat-free milk and other low-fat dairy products, such as yogurt and cheese.  Women: Make sure to eat foods rich in folate, such as fortified grains and dark- green leafy vegetables.  Aim for at least 1 hour of physical activity every day.  Wear safety equipment when you play sports.  Get enough sleep.  Talk with us about managing your health care and insurance as  an adult.    YOUR FEELINGS  Most people have ups and downs. If you are feeling sad, depressed, nervous, irritable, hopeless, or angry, let us know or reach out to another health care professional.  Figure out healthy ways to deal with stress.  Try your best to solve problems and make decisions on your own.  Sexuality is an important part of your life. If you have any questions or concerns, we are here for you.    HEALTHY BEHAVIOR CHOICES  Avoid using drugs, alcohol, tobacco, steroids, and diet pills. Support friends who choose not to use.  If you use drugs or alcohol, let us know or talk with another trusted adult about it. We can help you with quitting or cutting down on your use.  Make healthy decisions about your sexual behavior.  If you are sexually active, always practice safe sex. Always use birth control along with a condom to prevent pregnancy and sexually transmitted infections.  All sexual activity should be something you want. No one should ever force or try to convince you.  Protect your hearing at work, home, and concerts. Keep your earbud volume down.    STAYING SAFE  Always be a safe and cautious .  Insist that everyone use a lap and shoulder seat belt.  Limit the number of friends in the car and avoid driving at night.  Avoid distractions. Never text or talk on the phone while you drive.  Do not ride in a vehicle with someone who has been using drugs or alcohol.  If you feel unsafe driving or riding with someone, call someone you trust to drive you.  Wear helmets and protective gear while playing sports. Wear a helmet when riding a bike, a motorcycle, or an ATV or when skiing or skateboarding.  Always use sunscreen and a hat when you re outside.  Fighting and carrying weapons can be dangerous. Talk with your parents, teachers, or doctor about how to avoid these situations.        Consistent with Bright Futures: Guidelines for Health Supervision of Infants, Children, and Adolescents, 4th  Edition  For more information, go to https://brightfutures.aap.org.

## 2022-09-08 ENCOUNTER — TRANSFERRED RECORDS (OUTPATIENT)
Dept: MULTI SPECIALTY CLINIC | Facility: CLINIC | Age: 22
End: 2022-09-08

## 2022-09-08 LAB — PAP SMEAR - HIM PATIENT REPORTED: NORMAL

## 2022-09-11 ENCOUNTER — HEALTH MAINTENANCE LETTER (OUTPATIENT)
Age: 22
End: 2022-09-11

## 2022-10-18 ENCOUNTER — TRANSFERRED RECORDS (OUTPATIENT)
Dept: HEALTH INFORMATION MANAGEMENT | Facility: CLINIC | Age: 22
End: 2022-10-18

## 2023-03-09 ENCOUNTER — TELEPHONE (OUTPATIENT)
Dept: PEDIATRICS | Facility: CLINIC | Age: 23
End: 2023-03-09
Payer: COMMERCIAL

## 2023-03-09 ENCOUNTER — MEDICAL CORRESPONDENCE (OUTPATIENT)
Dept: HEALTH INFORMATION MANAGEMENT | Facility: CLINIC | Age: 23
End: 2023-03-09

## 2023-03-09 NOTE — TELEPHONE ENCOUNTER
Forms/Letter Request    Type of form/letter: Life Insurance forms    Have you been seen for this request: Yes re: patient Dx/pt disability    Do we have the form/letter: No -parent Rut will be faxing them (please watch for)    When is form/letter needed by: asap    How would you like the form/letter returned: mail hard copy to parent & scan to email to:  [email or web address]    Patient Notified form requests are processed in 3-5 business days:No    Could we send this information to you in WebAction or would you prefer to receive a phone call?:   Patient would prefer a phone call   Okay to leave a detailed message?: Yes at Cell number on file:    Telephone Information:   Mobile [phone]

## 2023-03-14 ENCOUNTER — MYC MEDICAL ADVICE (OUTPATIENT)
Dept: PEDIATRICS | Facility: CLINIC | Age: 23
End: 2023-03-14
Payer: COMMERCIAL

## 2023-03-16 PROBLEM — F84.0 AUTISM: Status: ACTIVE | Noted: 2023-03-16

## 2023-08-31 ASSESSMENT — ENCOUNTER SYMPTOMS
MYALGIAS: 0
JOINT SWELLING: 0
NAUSEA: 0
SHORTNESS OF BREATH: 0
FREQUENCY: 0
COUGH: 0
CONSTIPATION: 0
DIARRHEA: 0
NERVOUS/ANXIOUS: 1
EYE PAIN: 0
PARESTHESIAS: 0
HEADACHES: 0
DIZZINESS: 0
HEMATOCHEZIA: 0
CHILLS: 0
BREAST MASS: 0
HEMATURIA: 0
PALPITATIONS: 0
DYSURIA: 0
HEARTBURN: 0
ARTHRALGIAS: 0
FEVER: 0
WEAKNESS: 0
ABDOMINAL PAIN: 0
SORE THROAT: 0

## 2023-09-07 ENCOUNTER — OFFICE VISIT (OUTPATIENT)
Dept: FAMILY MEDICINE | Facility: CLINIC | Age: 23
End: 2023-09-07
Payer: COMMERCIAL

## 2023-09-07 VITALS
BODY MASS INDEX: 34.87 KG/M2 | HEIGHT: 61 IN | TEMPERATURE: 98.5 F | RESPIRATION RATE: 16 BRPM | HEART RATE: 63 BPM | WEIGHT: 184.7 LBS | SYSTOLIC BLOOD PRESSURE: 118 MMHG | OXYGEN SATURATION: 97 % | DIASTOLIC BLOOD PRESSURE: 74 MMHG

## 2023-09-07 DIAGNOSIS — Z00.00 ROUTINE HISTORY AND PHYSICAL EXAMINATION OF ADULT: Primary | ICD-10-CM

## 2023-09-07 DIAGNOSIS — G43.909 MIGRAINE WITHOUT STATUS MIGRAINOSUS, NOT INTRACTABLE, UNSPECIFIED MIGRAINE TYPE: ICD-10-CM

## 2023-09-07 DIAGNOSIS — L70.9 ACNE, UNSPECIFIED ACNE TYPE: ICD-10-CM

## 2023-09-07 DIAGNOSIS — F81.9 DELAY OF COGNITIVE DEVELOPMENT: ICD-10-CM

## 2023-09-07 PROBLEM — L85.8 KP (KERATOSIS PILARIS): Status: ACTIVE | Noted: 2023-09-07

## 2023-09-07 PROBLEM — F41.1 ANXIETY STATE: Status: ACTIVE | Noted: 2023-09-07

## 2023-09-07 PROBLEM — F84.9 PERVASIVE DEVELOPMENTAL DISORDER: Status: ACTIVE | Noted: 2023-03-16

## 2023-09-07 PROBLEM — H52.209 ASTIGMATISM: Status: ACTIVE | Noted: 2017-10-11

## 2023-09-07 LAB
ALBUMIN SERPL BCG-MCNC: 4.3 G/DL (ref 3.5–5.2)
ALP SERPL-CCNC: 75 U/L (ref 35–104)
ALT SERPL W P-5'-P-CCNC: 13 U/L (ref 0–50)
ANION GAP SERPL CALCULATED.3IONS-SCNC: 9 MMOL/L (ref 7–15)
AST SERPL W P-5'-P-CCNC: 23 U/L (ref 0–45)
BILIRUB SERPL-MCNC: 0.4 MG/DL
BUN SERPL-MCNC: 14.1 MG/DL (ref 6–20)
CALCIUM SERPL-MCNC: 9.3 MG/DL (ref 8.6–10)
CHLORIDE SERPL-SCNC: 103 MMOL/L (ref 98–107)
CHOLEST SERPL-MCNC: 160 MG/DL
CREAT SERPL-MCNC: 1.1 MG/DL (ref 0.51–0.95)
DEPRECATED HCO3 PLAS-SCNC: 25 MMOL/L (ref 22–29)
EGFRCR SERPLBLD CKD-EPI 2021: 73 ML/MIN/1.73M2
ERYTHROCYTE [DISTWIDTH] IN BLOOD BY AUTOMATED COUNT: 12.9 % (ref 10–15)
GLUCOSE SERPL-MCNC: 90 MG/DL (ref 70–99)
HBA1C MFR BLD: 5.3 % (ref 0–5.6)
HCT VFR BLD AUTO: 47.8 % (ref 35–47)
HDLC SERPL-MCNC: 36 MG/DL
HGB BLD-MCNC: 15.2 G/DL (ref 11.7–15.7)
LDLC SERPL CALC-MCNC: 108 MG/DL
MCH RBC QN AUTO: 27 PG (ref 26.5–33)
MCHC RBC AUTO-ENTMCNC: 31.8 G/DL (ref 31.5–36.5)
MCV RBC AUTO: 85 FL (ref 78–100)
NONHDLC SERPL-MCNC: 124 MG/DL
PLATELET # BLD AUTO: 363 10E3/UL (ref 150–450)
POTASSIUM SERPL-SCNC: 4.4 MMOL/L (ref 3.4–5.3)
PROT SERPL-MCNC: 7.7 G/DL (ref 6.4–8.3)
RBC # BLD AUTO: 5.63 10E6/UL (ref 3.8–5.2)
SODIUM SERPL-SCNC: 137 MMOL/L (ref 136–145)
TRIGL SERPL-MCNC: 78 MG/DL
WBC # BLD AUTO: 10.3 10E3/UL (ref 4–11)

## 2023-09-07 PROCEDURE — 80053 COMPREHEN METABOLIC PANEL: CPT | Performed by: INTERNAL MEDICINE

## 2023-09-07 PROCEDURE — 36415 COLL VENOUS BLD VENIPUNCTURE: CPT | Performed by: INTERNAL MEDICINE

## 2023-09-07 PROCEDURE — 85027 COMPLETE CBC AUTOMATED: CPT | Performed by: INTERNAL MEDICINE

## 2023-09-07 PROCEDURE — 87491 CHLMYD TRACH DNA AMP PROBE: CPT | Performed by: INTERNAL MEDICINE

## 2023-09-07 PROCEDURE — 83036 HEMOGLOBIN GLYCOSYLATED A1C: CPT | Performed by: INTERNAL MEDICINE

## 2023-09-07 PROCEDURE — 99395 PREV VISIT EST AGE 18-39: CPT | Performed by: INTERNAL MEDICINE

## 2023-09-07 PROCEDURE — 80061 LIPID PANEL: CPT | Performed by: INTERNAL MEDICINE

## 2023-09-07 PROCEDURE — 99213 OFFICE O/P EST LOW 20 MIN: CPT | Mod: 25 | Performed by: INTERNAL MEDICINE

## 2023-09-07 ASSESSMENT — ENCOUNTER SYMPTOMS
FEVER: 0
BREAST MASS: 0
DIZZINESS: 0
EYE PAIN: 0
MYALGIAS: 0
JOINT SWELLING: 0
NERVOUS/ANXIOUS: 1
NAUSEA: 0
CONSTIPATION: 0
FREQUENCY: 0
PARESTHESIAS: 0
WEAKNESS: 0
HEMATOCHEZIA: 0
HEADACHES: 0
SHORTNESS OF BREATH: 0
HEMATURIA: 0
ABDOMINAL PAIN: 0
DIARRHEA: 0
HEARTBURN: 0
CHILLS: 0
ARTHRALGIAS: 0
SORE THROAT: 0
PALPITATIONS: 0
COUGH: 0
DYSURIA: 0

## 2023-09-07 NOTE — PATIENT INSTRUCTIONS
Could consider trial of differin gel over the face once per day- if too dry do every other day.     If acne not better with differin we can do a daily oral antibiotic to help    Keep up the great work with the changes you have made!    Let me know if issues come up.    Hari England MD

## 2023-09-07 NOTE — PROGRESS NOTES
SUBJECTIVE:   CC: Daisy is an 23 year old who presents for preventive health visit.       9/7/2023    11:31 AM   Additional Questions   Roomed by LC-LPN   Accompanied by MOM         9/7/2023    11:31 AM   Patient Reported Additional Medications   Patient reports taking the following new medications NA       Healthy Habits:     Getting at least 3 servings of Calcium per day:  Yes    Bi-annual eye exam:  Yes    Dental care twice a year:  Yes    Sleep apnea or symptoms of sleep apnea:  None    Diet:  Regular (no restrictions)    Frequency of exercise:  1 day/week    Duration of exercise:  Less than 15 minutes    Taking medications regularly:  Yes    Medication side effects:  None    Additional concerns today:  No    Establish care- [facility] IUD for cycle control.     Working on eating healthy- has lost some weight since last year  Wt Readings from Last 4 Encounters:   09/07/23 83.8 kg (184 lb 11.2 oz)   08/16/22 95.7 kg (211 lb)   08/19/20 88.4 kg (194 lb 14.4 oz) (97 %, Z= 1.87)*   12/20/18 86 kg (189 lb 9.6 oz) (97 %, Z= 1.84)*     * Growth percentiles are based on CDC (Girls, 2-20 Years) data.     Sleeping is going well.     Mood feels going well- occasionally some anxiety. Likes things neatly.     Done with step program, looking into phase up [place]- lives in [place]-   - has good skills with calling on phone, cleaning bathroom, helping with getting clothes to laundry room.     No menstrual cycle- no cramping or bleeding.     Some headaches- better with ice packs, can use ibuprofen.     Did accutane for acne in the past. Does have scarring from acne on chest and back from the deeper areas.     Today's PHQ-2 Score:       9/6/2023    12:23 PM   PHQ-2 ( 1999 Pfizer)   Q1: Little interest or pleasure in doing things 0   Q2: Feeling down, depressed or hopeless 0   PHQ-2 Score 0   Q1: Little interest or pleasure in doing things Not at all   Q2: Feeling down, depressed or hopeless Not at all  "  PHQ-2 Score 0         Social History     Tobacco Use    Smoking status: Never    Smokeless tobacco: Never   Substance Use Topics    Alcohol use: Not on file             8/31/2023     9:48 AM   Alcohol Use   Prescreen: >3 drinks/day or >7 drinks/week? Not Applicable     Reviewed orders with patient.  Reviewed health maintenance and updated orders accordingly - Yes  Labs reviewed in EPIC    Breast Cancer Screening:        History of abnormal Pap smear: NO - age 21-29 PAP every 3 years recommended     Reviewed and updated as needed this visit by clinical staff   Tobacco  Allergies  Meds              Reviewed and updated as needed this visit by Provider                     Review of Systems   Constitutional:  Negative for chills and fever.   HENT:  Negative for congestion, ear pain, hearing loss and sore throat.    Eyes:  Negative for pain and visual disturbance.   Respiratory:  Negative for cough and shortness of breath.    Cardiovascular:  Negative for chest pain, palpitations and peripheral edema.   Gastrointestinal:  Negative for abdominal pain, constipation, diarrhea, heartburn, hematochezia and nausea.   Breasts:  Negative for tenderness, breast mass and discharge.   Genitourinary:  Negative for dysuria, frequency, genital sores, hematuria, pelvic pain, urgency, vaginal bleeding and vaginal discharge.   Musculoskeletal:  Negative for arthralgias, joint swelling and myalgias.   Skin:  Negative for rash.   Neurological:  Negative for dizziness, weakness, headaches and paresthesias.   Psychiatric/Behavioral:  Negative for mood changes. The patient is nervous/anxious.           OBJECTIVE:   /74 (BP Location: Right arm, Patient Position: Sitting, Cuff Size: Adult Large)   Pulse 63   Temp 98.5  F (36.9  C) (Oral)   Resp 16   Ht 1.56 m (5' 1.42\")   Wt 83.8 kg (184 lb 11.2 oz)   SpO2 97%   BMI 34.43 kg/m    Physical Exam  GENERAL: healthy, alert and no distress  EYES: Eyes grossly normal to inspection, " "PERRL and conjunctivae and sclerae normal  HENT: ear canals and TM's normal, nose and mouth without ulcers or lesions  NECK: no adenopathy, no asymmetry, masses, or scars and thyroid normal to palpation  RESP: lungs clear to auscultation - no rales, rhonchi or wheezes  CV: regular rate and rhythm, normal S1 S2, no S3 or S4, no murmur, click or rub, no peripheral edema and peripheral pulses strong  ABDOMEN: soft, nontender, no hepatosplenomegaly, no masses and bowel sounds normal  MS: no gross musculoskeletal defects noted, no edema  SKIN: noted acne on the face  NEURO: Normal strength and tone, mentation intact and speech intact  PSYCH: mentation appears normal, affect normal/bright    Diagnostic Test Results:  Labs reviewed in Epic    ASSESSMENT/PLAN:       ICD-10-CM    1. Routine history and physical examination of adult  Z00.00 Comprehensive metabolic panel     Lipid panel reflex to direct LDL Fasting     Hemoglobin A1c     CBC with platelets   Discussed routine health guidance  Chlamydia trachomatis PCR - Clinic Collect     Comprehensive metabolic panel     Lipid panel reflex to direct LDL Fasting     Hemoglobin A1c     CBC with platelets     Chlamydia trachomatis PCR - Clinic Collect      2. Acne, unspecified acne type  L70.9 Will trial topical differin gel over the counter, consider doxycycline for acne treatment if no benefit.      3. Migraine without status migrainosus, not intractable, unspecified migraine type  G43.909 Improves with hydration and OTC medication, no red flag symptoms right now      4. Delay of cognitive development  F81.9 Mom as guardian, has plans for day program, stable without issues.                COUNSELING:  Reviewed preventive health counseling, as reflected in patient instructions      BMI:   Estimated body mass index is 34.43 kg/m  as calculated from the following:    Height as of this encounter: 1.56 m (5' 1.42\").    Weight as of this encounter: 83.8 kg (184 lb 11.2 oz).   Weight " management plan: Discussed healthy diet and exercise guidelines      She reports that she has never smoked. She has never used smokeless tobacco.          Hari England MD  [facility]

## 2023-09-08 LAB — C TRACH DNA SPEC QL NAA+PROBE: NEGATIVE

## 2024-06-10 NOTE — TELEPHONE ENCOUNTER
Brigitte is requesting a call to confirm that the below sets of forms were receive d by the clinic.        Name of form/paperwork: Other:  Consumer Directed Community Support Treatment Form - [facility]  Have you been seen for this request: Yes:  12/20/2018  Do we have the form: Yes- Patient's mother faxed this to [phone] at 1:02 PM today  When is form needed by: ASAP. Patient's mother states this is time sensitive.   How would you like the form returned: Mail to mom at address on file   Fax Number: N/A  Patient Notified form requests are processed in 3-5 business days: Yes  (If patient needs form sooner, please note that in this message.)  Okay to leave a detailed message? Yes        Name of form/paperwork: Other:  Medical History and Release Form - [facility]  [place]   Have you been seen for this request: Yes:  12/20/2018  Do we have the form: Yes- Patient's mother faxed this to [phone] at 1:02 PM today  When is form needed by: ASAP. Patient's mother states this is time sensitive.   How would you like the form returned: Fax and then mail to mom at address on file.  Fax Number:     Attn:  Kai Carrasco,    [facility] [employer] [facility]   [phone]    Patient Notified form requests are processed in 3-5 business days: Yes  (If patient needs form sooner, please note that in this message.)  Okay to leave a detailed message? Yes    
Forms have been signed, faxed and mailed back to mom.  
[Time Spent: ___ minutes] : I have spent [unfilled] minutes of time on the encounter.

## 2024-10-12 SDOH — HEALTH STABILITY: PHYSICAL HEALTH: ON AVERAGE, HOW MANY DAYS PER WEEK DO YOU ENGAGE IN MODERATE TO STRENUOUS EXERCISE (LIKE A BRISK WALK)?: 0 DAYS

## 2024-10-12 ASSESSMENT — SOCIAL DETERMINANTS OF HEALTH (SDOH): HOW OFTEN DO YOU GET TOGETHER WITH FRIENDS OR RELATIVES?: TWICE A WEEK

## 2024-10-16 ENCOUNTER — OFFICE VISIT (OUTPATIENT)
Dept: FAMILY MEDICINE | Facility: CLINIC | Age: 24
End: 2024-10-16
Payer: COMMERCIAL

## 2024-10-16 VITALS
TEMPERATURE: 98.1 F | WEIGHT: 198 LBS | HEART RATE: 61 BPM | DIASTOLIC BLOOD PRESSURE: 62 MMHG | SYSTOLIC BLOOD PRESSURE: 102 MMHG | OXYGEN SATURATION: 97 % | HEIGHT: 62 IN | BODY MASS INDEX: 36.44 KG/M2

## 2024-10-16 DIAGNOSIS — Z00.00 ROUTINE HISTORY AND PHYSICAL EXAMINATION OF ADULT: Primary | ICD-10-CM

## 2024-10-16 DIAGNOSIS — F81.9 DELAY OF COGNITIVE DEVELOPMENT: ICD-10-CM

## 2024-10-16 DIAGNOSIS — R79.89 ELEVATED SERUM CREATININE: ICD-10-CM

## 2024-10-16 LAB
ALBUMIN SERPL BCG-MCNC: 4.4 G/DL (ref 3.5–5.2)
ALP SERPL-CCNC: 61 U/L (ref 40–150)
ALT SERPL W P-5'-P-CCNC: 19 U/L (ref 0–50)
ANION GAP SERPL CALCULATED.3IONS-SCNC: 9 MMOL/L (ref 7–15)
AST SERPL W P-5'-P-CCNC: 22 U/L (ref 0–45)
BILIRUB SERPL-MCNC: 0.4 MG/DL
BUN SERPL-MCNC: 14.7 MG/DL (ref 6–20)
CALCIUM SERPL-MCNC: 9.3 MG/DL (ref 8.8–10.4)
CHLORIDE SERPL-SCNC: 102 MMOL/L (ref 98–107)
CHOLEST SERPL-MCNC: 159 MG/DL
CREAT SERPL-MCNC: 1.13 MG/DL (ref 0.51–0.95)
EGFRCR SERPLBLD CKD-EPI 2021: 70 ML/MIN/1.73M2
EST. AVERAGE GLUCOSE BLD GHB EST-MCNC: 108 MG/DL
FASTING STATUS PATIENT QL REPORTED: NO
FASTING STATUS PATIENT QL REPORTED: NO
GLUCOSE SERPL-MCNC: 89 MG/DL (ref 70–99)
HBA1C MFR BLD: 5.4 % (ref 0–5.6)
HCO3 SERPL-SCNC: 27 MMOL/L (ref 22–29)
HDLC SERPL-MCNC: 41 MG/DL
HGB BLD-MCNC: 15.2 G/DL (ref 11.7–15.7)
LDLC SERPL CALC-MCNC: 103 MG/DL
NONHDLC SERPL-MCNC: 118 MG/DL
POTASSIUM SERPL-SCNC: 4.5 MMOL/L (ref 3.4–5.3)
PROT SERPL-MCNC: 7.8 G/DL (ref 6.4–8.3)
SODIUM SERPL-SCNC: 138 MMOL/L (ref 135–145)
TRIGL SERPL-MCNC: 77 MG/DL
VIT D+METAB SERPL-MCNC: 15 NG/ML (ref 20–50)

## 2024-10-16 PROCEDURE — 99395 PREV VISIT EST AGE 18-39: CPT | Performed by: INTERNAL MEDICINE

## 2024-10-16 PROCEDURE — 80053 COMPREHEN METABOLIC PANEL: CPT | Performed by: INTERNAL MEDICINE

## 2024-10-16 PROCEDURE — 85018 HEMOGLOBIN: CPT | Performed by: INTERNAL MEDICINE

## 2024-10-16 PROCEDURE — 80061 LIPID PANEL: CPT | Performed by: INTERNAL MEDICINE

## 2024-10-16 PROCEDURE — 83036 HEMOGLOBIN GLYCOSYLATED A1C: CPT | Performed by: INTERNAL MEDICINE

## 2024-10-16 PROCEDURE — 82306 VITAMIN D 25 HYDROXY: CPT | Performed by: INTERNAL MEDICINE

## 2024-10-16 PROCEDURE — 36415 COLL VENOUS BLD VENIPUNCTURE: CPT | Performed by: INTERNAL MEDICINE

## 2024-10-16 NOTE — PATIENT INSTRUCTIONS
Try to add in short activity of 10 minutes a couple times per day.     Labs today as we discussed.    Let me know if issues come up.    Hari England MD    Patient Education   Preventive Care Advice   This is general advice given by our system to help you stay healthy. However, your care team may have specific advice just for you. Please talk to your care team about your preventive care needs.  Nutrition  Eat 5 or more servings of fruits and vegetables each day.  Try wheat bread, brown rice and whole grain pasta (instead of white bread, rice, and pasta).  Get enough calcium and vitamin D. Check the label on foods and aim for 100% of the RDA (recommended daily allowance).  Lifestyle  Exercise at least 150 minutes each week  (30 minutes a day, 5 days a week).  Do muscle strengthening activities 2 days a week. These help control your weight and prevent disease.  No smoking.  Wear sunscreen to prevent skin cancer.  Have a dental exam and cleaning every 6 months.  Yearly exams  See your health care team every year to talk about:  Any changes in your health.  Any medicines your care team has prescribed.  Preventive care, family planning, and ways to prevent chronic diseases.  Shots (vaccines)   HPV shots (up to age 26), if you've never had them before.  Hepatitis B shots (up to age 59), if you've never had them before.  COVID-19 shot: Get this shot when it's due.  Flu shot: Get a flu shot every year.  Tetanus shot: Get a tetanus shot every 10 years.  Pneumococcal, hepatitis A, and RSV shots: Ask your care team if you need these based on your risk.  Shingles shot (for age 50 and up)  General health tests  Diabetes screening:  Starting at age 35, Get screened for diabetes at least every 3 years.  If you are younger than age 35, ask your care team if you should be screened for diabetes.  Cholesterol test: At age 39, start having a cholesterol test every 5 years, or more often if advised.  Bone density scan (DEXA): At age 50, ask  your care team if you should have this scan for osteoporosis (brittle bones).  Hepatitis C: Get tested at least once in your life.  STIs (sexually transmitted infections)  Before age 24: Ask your care team if you should be screened for STIs.  After age 24: Get screened for STIs if you're at risk. You are at risk for STIs (including HIV) if:  You are sexually active with more than one person.  You don't use condoms every time.  You or a partner was diagnosed with a sexually transmitted infection.  If you are at risk for HIV, ask about PrEP medicine to prevent HIV.  Get tested for HIV at least once in your life, whether you are at risk for HIV or not.  Cancer screening tests  Cervical cancer screening: If you have a cervix, begin getting regular cervical cancer screening tests starting at age 21.  Breast cancer scan (mammogram): If you've ever had breasts, begin having regular mammograms starting at age 40. This is a scan to check for breast cancer.  Colon cancer screening: It is important to start screening for colon cancer at age 45.  Have a colonoscopy test every 10 years (or more often if you're at risk) Or, ask your provider about stool tests like a FIT test every year or Cologuard test every 3 years.  To learn more about your testing options, visit:   .  For help making a decision, visit:   https://bit.ly/cw45363.  Prostate cancer screening test: If you have a prostate, ask your care team if a prostate cancer screening test (PSA) at age 55 is right for you.  Lung cancer screening: If you are a current or former smoker ages 50 to 80, ask your care team if ongoing lung cancer screenings are right for you.  For informational purposes only. Not to replace the advice of your health care provider. Copyright   2023 [employer]. All rights reserved. Clinically reviewed by the [facility]. Mambu [id number] - REV 01/24.

## 2024-10-16 NOTE — PROGRESS NOTES
"Preventive Care Visit  [facility]  Hari England MD, Internal Medicine - Pediatrics  Oct 16, 2024      Assessment & Plan     Routine history and physical examination of adult  Discussed routine health guidance, [facility]   - Comprehensive metabolic panel (BMP + Alb, Alk Phos, ALT, AST, Total. Bili, TP); Future  - Hemoglobin A1c; Future  - Lipid panel reflex to direct LDL Fasting; Future  - Vitamin D deficiency screening; Future  - Hemoglobin; Future  - Comprehensive metabolic panel (BMP + Alb, Alk Phos, ALT, AST, Total. Bili, TP)  - Hemoglobin A1c  - Lipid panel reflex to direct LDL Fasting  - Vitamin D deficiency screening  - Hemoglobin    Developmental delay- stable, working on daily tasks,           BMI  Estimated body mass index is 36.81 kg/m  as calculated from the following:    Height as of this encounter: 1.562 m (5' 1.5\").    Weight as of this encounter: 89.8 kg (198 lb).   Weight management plan: Discussed healthy diet and exercise guidelines    Counseling  Appropriate preventive services were addressed with this patient via screening, questionnaire, or discussion as appropriate for fall prevention, nutrition, physical activity, Tobacco-use cessation, social engagement, weight loss and cognition.  Checklist reviewing preventive services available has been given to the patient.  Reviewed patient's diet, addressing concerns and/or questions.   She is at risk for psychosocial distress and has been provided with information to reduce risk.           Lino Villa is a 24 year old, presenting for the following:  Physical and Gyn Exam        10/16/2024    10:32 AM   Additional Questions   Roomed by Makayla KIRAN   Accompanied by Mother        Health Care Directive  Patient has a Health Care Directive on file  Advance care planning document is on file and is current.    HPI                10/12/2024   General Health   How would you rate your overall physical health? Good   Feel " stress (tense, anxious, or unable to sleep) Only a little      (!) STRESS CONCERN      10/12/2024   Nutrition   Three or more servings of calcium each day? Yes   Diet: Regular (no restrictions)   How many servings of fruit and vegetables per day? (!) 2-3   How many sweetened beverages each day? 0-1            10/12/2024   Exercise   Days per week of moderate/strenous exercise 0 days      (!) EXERCISE CONCERN      10/12/2024   Social Factors   Frequency of gathering with friends or relatives Twice a week   Worry food won't last until get money to buy more No   Food not last or not have enough money for food? No   Do you have housing? (Housing is defined as stable permanent housing and does not include staying ouside in a car, in a tent, in an abandoned building, in an overnight shelter, or couch-surfing.) Yes   Are you worried about losing your housing? No   Lack of transportation? No   Unable to get utilities (heat,electricity)? No            10/12/2024   Dental   Dentist two times every year? Yes            10/12/2024   TB Screening   Were you born outside of the [place]? No            Today's PHQ-2 Score:       10/15/2024     2:43 PM   PHQ-2 ( 1999 Pfizer)   PHQ-2 Score Incomplete           10/12/2024   Substance Use   Alcohol more than 3/day or more than 7/wk No   Do you use any other substances recreationally? No        Social History     Tobacco Use    Smoking status: Never    Smokeless tobacco: Never   Substance Use Topics    Alcohol use: Never    Drug use: Never           10/12/2024   STI Screening   New sexual partner(s) since last STI/HIV test? No        History of abnormal Pap smear: No - age 21-29 PAP every 3 years recommended             10/12/2024   Contraception/Family Planning   Questions about contraception or family planning No           Reviewed and updated as needed this visit by Provider   Tobacco  Allergies  Meds  Problems  Med Hx  Surg Hx  Fam Hx                  Review of  "Systems  Constitutional, HEENT, cardiovascular, pulmonary, gi and gu systems are negative, except as otherwise noted.     Objective    Exam  /62   Pulse 61   Temp 98.1  F (36.7  C)   Ht 1.562 m (5' 1.5\")   Wt 89.8 kg (198 lb)   SpO2 97%   BMI 36.81 kg/m     Estimated body mass index is 36.81 kg/m  as calculated from the following:    Height as of this encounter: 1.562 m (5' 1.5\").    Weight as of this encounter: 89.8 kg (198 lb).    Physical Exam  GENERAL: alert and no distress  EYES: Eyes grossly normal to inspection, PERRL and conjunctivae and sclerae normal  HENT: ear canals and TM's normal, nose and mouth without ulcers or lesions  NECK: no adenopathy, no asymmetry, masses, or scars  RESP: lungs clear to auscultation - no rales, rhonchi or wheezes  CV: regular rate and rhythm, normal S1 S2, no S3 or S4, no murmur, click or rub, no peripheral edema  ABDOMEN: soft, nontender, no hepatosplenomegaly, no masses and bowel sounds normal  MS: no gross musculoskeletal defects noted, no edema  SKIN: no suspicious lesions or rashes  NEURO: Normal strength and tone, mentation intact and speech normal  PSYCH: mentation appears normal, affect normal/bright        Signed Electronically by: Hari England MD    "

## 2024-10-30 ENCOUNTER — HOSPITAL ENCOUNTER (OUTPATIENT)
Dept: ULTRASOUND IMAGING | Facility: CLINIC | Age: 24
Discharge: HOME | End: 2024-10-30
Attending: INTERNAL MEDICINE | Admitting: INTERNAL MEDICINE
Payer: COMMERCIAL

## 2024-10-30 DIAGNOSIS — R79.89 ELEVATED SERUM CREATININE: ICD-10-CM

## 2024-10-30 PROCEDURE — 76770 US EXAM ABDO BACK WALL COMP: CPT

## 2025-06-26 ENCOUNTER — TRANSFERRED RECORDS (OUTPATIENT)
Dept: HEALTH INFORMATION MANAGEMENT | Facility: CLINIC | Age: 25
End: 2025-06-26
Payer: COMMERCIAL